# Patient Record
(demographics unavailable — no encounter records)

---

## 2024-10-14 NOTE — DISCUSSION/SUMMARY
[FreeTextEntry1] : I reviewed her echocardiogram from August 2019.  This revealed a normal ejection fraction, with bileaflet mitral valve prolapse and mild to moderate eccentric mitral regurgitation.  Echocardiography was performed July 6, 2023. This revealed a normal ejection fraction.  There was bileaflet prolapse, with moderate to severe mitral regurgitation.I reviewed serial EKGs.  Her EKGs have commonly had a degree of poor R wave progression.  Her EKG today is unchanged.  We reviewed her most recent echocardiogram results together.  She has moderate to severe mitral regurgitation in the setting of bileaflet prolapse.  She does not have any mitral regurgitation audible on examination, and she has no symptoms.  I have suggested repeating her echocardiogram to ensure that she has not had any progression in the degree of mitral regurgitation.  I will be in contact with her to discuss the results.  If everything is stable, she can see me in a year. [EKG obtained to assist in diagnosis and management of assessed problem(s)] : EKG obtained to assist in diagnosis and management of assessed problem(s)

## 2024-10-14 NOTE — HISTORY OF PRESENT ILLNESS
[FreeTextEntry1] : aPco presented to the office today for a cardiovascular evaluation.  She was last seen in the office in February, 2023..  She is now 66 years old, with a history of mild mitral valve prolapse with mild to moderate mitral regurgitation, documented formally on an echo from 2011, and reconfirmed on her most recent echo from 2019. She does not have any other medical issues, and is on no medications. She has osteoarthritis, and is now status post hip replacement.   I saw her in 2019, at which time she was being planned for surgery, but her EKG was abnormal, suggesting anterior and inferior infarction.  I recommended precautionary repetition of her echocardiogram, but otherwise allowed her to proceed with her surgery.  I saw her in February, 2023 in anticipation of hip replacement.  I asked her to obtain an echocardiogram.  Echocardiography was performed July 6, 2023. This revealed a normal ejection fraction.  There was bileaflet prolapse, with moderate to severe mitral regurgitation.  She presents to the office today having been feeling well.  She reports no chest discomfort or shortness of breath suggestive of angina.  She does not exercise regularly, but does run after her small grandchildren, and is able to do that without difficulty.  She denies orthopnea, PND and lower extremity edema.  She denies dizziness and syncope.  She may experience palpitations intermittently, if she wakes up in the middle of the night with some anxiety.  She reports that her blood pressure and cholesterol have been well controlled.

## 2024-10-14 NOTE — PHYSICAL EXAM
[General Appearance - Well Developed] : well developed [Normal Appearance] : normal appearance [Well Groomed] : well groomed [General Appearance - Well Nourished] : well nourished [No Deformities] : no deformities [General Appearance - In No Acute Distress] : no acute distress [Normal Conjunctiva] : the conjunctiva exhibited no abnormalities [Eyelids - No Xanthelasma] : the eyelids demonstrated no xanthelasmas [Normal Oral Mucosa] : normal oral mucosa [No Oral Pallor] : no oral pallor [No Oral Cyanosis] : no oral cyanosis [Normal Jugular Venous A Waves Present] : normal jugular venous A waves present [Normal Jugular Venous V Waves Present] : normal jugular venous V waves present [No Jugular Venous Baltazar A Waves] : no jugular venous baltazar A waves [Normal Rate] : normal [Rhythm Regular] : regular [Normal S1] : normal S1 [Normal S2] : normal S2 [No Gallop] : no gallop heard [Click] : a ~M click was heard [No Murmur] : no murmurs heard [2+] : right 2+ [1+] : left 1+ [No Abnormalities] : the abdominal aorta was not enlarged and no bruit was heard [No Pitting Edema] : no pitting edema present [Respiration, Rhythm And Depth] : normal respiratory rhythm and effort [Exaggerated Use Of Accessory Muscles For Inspiration] : no accessory muscle use [Auscultation Breath Sounds / Voice Sounds] : lungs were clear to auscultation bilaterally [Abdomen Soft] : soft [Abdomen Tenderness] : non-tender [Abdomen Mass (___ Cm)] : no abdominal mass palpated [Abnormal Walk] : normal gait [Gait - Sufficient For Exercise Testing] : the gait was sufficient for exercise testing [Nail Clubbing] : no clubbing of the fingernails [Cyanosis, Localized] : no localized cyanosis [Petechial Hemorrhages (___cm)] : no petechial hemorrhages [Skin Color & Pigmentation] : normal skin color and pigmentation [] : no rash [No Venous Stasis] : no venous stasis [Skin Lesions] : no skin lesions [No Skin Ulcers] : no skin ulcer [No Xanthoma] : no  xanthoma was observed [Oriented To Time, Place, And Person] : oriented to person, place, and time [Affect] : the affect was normal [Mood] : the mood was normal [No Anxiety] : not feeling anxious [S3] : no S3 [S4] : no S4 [Right Carotid Bruit] : no bruit heard over the right carotid [Left Carotid Bruit] : no bruit heard over the left carotid [Right Femoral Bruit] : no bruit heard over the right femoral artery [Left Femoral Bruit] : no bruit heard over the left femoral artery